# Patient Record
Sex: FEMALE | Race: WHITE | URBAN - METROPOLITAN AREA
[De-identification: names, ages, dates, MRNs, and addresses within clinical notes are randomized per-mention and may not be internally consistent; named-entity substitution may affect disease eponyms.]

---

## 2023-01-23 ENCOUNTER — CLAIMS CREATED FROM THE CLAIM WINDOW (OUTPATIENT)
Dept: URBAN - METROPOLITAN AREA MEDICAL CENTER 25 | Facility: MEDICAL CENTER | Age: 48
End: 2023-01-23

## 2023-01-23 ENCOUNTER — CLAIMS CREATED FROM THE CLAIM WINDOW (OUTPATIENT)
Dept: URBAN - METROPOLITAN AREA MEDICAL CENTER 25 | Facility: MEDICAL CENTER | Age: 48
End: 2023-01-23
Payer: COMMERCIAL

## 2023-01-23 DIAGNOSIS — R93.3 ABN FINDINGS-GI TRACT: ICD-10-CM

## 2023-01-23 DIAGNOSIS — R10.84 ABDOMINAL CRAMPING, GENERALIZED: ICD-10-CM

## 2023-01-23 PROCEDURE — 99204 OFFICE O/P NEW MOD 45 MIN: CPT | Performed by: INTERNAL MEDICINE

## 2023-01-23 PROCEDURE — 99225 SUBSEQUENT OBSERVATION CARE, PER DAY, FOR THE EVALUATION AND MANAGEMENT OF A PATIENT, WHICH REQUIRES AT LEAST 2 OF THESE 3 KEY COMPONENTS: AN EXPAND: CPT | Performed by: INTERNAL MEDICINE

## 2023-02-14 ENCOUNTER — LAB OUTSIDE AN ENCOUNTER (OUTPATIENT)
Dept: URBAN - METROPOLITAN AREA CLINIC 19 | Facility: CLINIC | Age: 48
End: 2023-02-14

## 2023-02-14 PROBLEM — 64613007: Status: ACTIVE | Noted: 2023-02-14

## 2023-02-14 PROBLEM — 307496006 DIVERTICULITIS: Status: ACTIVE | Noted: 2023-02-14

## 2023-02-14 PROBLEM — 398032003: Status: ACTIVE | Noted: 2023-02-14

## 2023-02-14 PROBLEM — 71419002: Status: ACTIVE | Noted: 2023-02-14

## 2023-02-15 ENCOUNTER — OFFICE VISIT (OUTPATIENT)
Dept: URBAN - METROPOLITAN AREA CLINIC 19 | Facility: CLINIC | Age: 48
End: 2023-02-15

## 2023-02-15 NOTE — HPI-TODAY'S VISIT:
2/15/2023:  Honey: The pt is a 48 yo F who presents for [facility] hospital follow-up for a case of abdominal pain, with some nausea and loose stools.  CT imaging was used to diagnose the patient of acute enteritis.  They were seen by my colleague, Dr. Jose Wiley, for consult in mid-Jan.  Stool studies were ordered.  Current diet:  Prior colonoscopies: Smoking history: Alcohol history: OAC/APT use: OTC medication use:  FH of CRCs or colon polyps or other colonic issues.

## 2023-02-21 ENCOUNTER — WEB ENCOUNTER (OUTPATIENT)
Dept: URBAN - METROPOLITAN AREA CLINIC 19 | Facility: CLINIC | Age: 48
End: 2023-02-21

## 2023-02-24 ENCOUNTER — LAB OUTSIDE AN ENCOUNTER (OUTPATIENT)
Dept: URBAN - METROPOLITAN AREA CLINIC 19 | Facility: CLINIC | Age: 48
End: 2023-02-24

## 2023-02-24 ENCOUNTER — DASHBOARD ENCOUNTERS (OUTPATIENT)
Age: 48
End: 2023-02-24

## 2023-02-24 ENCOUNTER — WEB ENCOUNTER (OUTPATIENT)
Dept: URBAN - METROPOLITAN AREA CLINIC 19 | Facility: CLINIC | Age: 48
End: 2023-02-24

## 2023-02-24 ENCOUNTER — OFFICE VISIT (OUTPATIENT)
Dept: URBAN - METROPOLITAN AREA CLINIC 19 | Facility: CLINIC | Age: 48
End: 2023-02-24
Payer: COMMERCIAL

## 2023-02-24 VITALS
HEIGHT: 66 IN | SYSTOLIC BLOOD PRESSURE: 118 MMHG | HEART RATE: 114 BPM | DIASTOLIC BLOOD PRESSURE: 66 MMHG | WEIGHT: 198.8 LBS | TEMPERATURE: 96.3 F | BODY MASS INDEX: 31.95 KG/M2

## 2023-02-24 DIAGNOSIS — K52.9 INFLAMMATION OF SMALL INTESTINE: ICD-10-CM

## 2023-02-24 DIAGNOSIS — Z86.010 HISTORY OF ADENOMATOUS POLYP OF COLON: ICD-10-CM

## 2023-02-24 DIAGNOSIS — R10.10 UPPER ABDOMINAL PAIN: ICD-10-CM

## 2023-02-24 DIAGNOSIS — R11.0 NAUSEA: ICD-10-CM

## 2023-02-24 PROCEDURE — 99214 OFFICE O/P EST MOD 30 MIN: CPT | Performed by: NURSE PRACTITIONER

## 2023-02-24 NOTE — HPI-TODAY'S VISIT:
47-year-old female presents to the office for hospital follow-up.  She was admitted to the hospital 1/22/23 for abdominal pain, nausea.  CT abdomen and pelvis shows generalized wall thickening within numerous loops of the jejunum, could be infectious versus inflammatory, small amount of free fluid in the pelvis, large right hydronephrosis with numerous nonobstructing calculi, pleural effusion with light Pleurx drain in place Labs WBC 10,000, hemoglobin 13.5 She has a history of lung cancer/pleurax drain, treated with surgery, Retevmo/chemo/XRT.  GI PCR negative C. difficile not completed Her pain was epigastric area, improving since leaving the hospital. Pain is intermittent, at random, worse if she eats greasy foods. Nausea has resolved.  Stools are formed, easy to pass. She never had diarrhea, just had increase in bowel movements- was formed.  Denies fever/chills.   She has hx of C diff 2 years ago. Hx of bowel obstruction 8 years ago, unknown cause.  Last Colonoscopy was 8 years ago, had a polyp- done in [place]- unable to recall who. She has never had an EGD.  Denies family hx of colon cancer or IBD.  Had labs with Haris AJ, and has CT chest next week.

## 2023-02-25 PROBLEM — 429047008: Status: ACTIVE | Noted: 2023-02-24

## 2023-03-02 LAB
C-REACTIVE PROTEIN, QUANT: 20
CALPROTECTIN, FECAL: 31
REQUEST PROBLEM: (no result)
SEDIMENTATION RATE-WESTERGREN: (no result)

## 2023-03-07 ENCOUNTER — TELEPHONE ENCOUNTER (OUTPATIENT)
Dept: RURAL CLINIC 1 | Facility: CLINIC | Age: 48
End: 2023-03-07

## 2023-03-09 LAB
C-REACTIVE PROTEIN, QUANT: 22
CALPROTECTIN, FECAL: (no result)
REQUEST PROBLEM: (no result)
SEDIMENTATION RATE-WESTERGREN: (no result)

## 2023-03-13 ENCOUNTER — OFFICE VISIT (OUTPATIENT)
Dept: URBAN - METROPOLITAN AREA SURGERY CENTER 31 | Facility: SURGERY CENTER | Age: 48
End: 2023-03-13

## 2023-03-13 ENCOUNTER — CLAIMS CREATED FROM THE CLAIM WINDOW (OUTPATIENT)
Dept: URBAN - METROPOLITAN AREA CLINIC 4 | Facility: CLINIC | Age: 48
End: 2023-03-13
Payer: COMMERCIAL

## 2023-03-13 ENCOUNTER — CLAIMS CREATED FROM THE CLAIM WINDOW (OUTPATIENT)
Dept: URBAN - METROPOLITAN AREA SURGERY CENTER 31 | Facility: SURGERY CENTER | Age: 48
End: 2023-03-13
Payer: COMMERCIAL

## 2023-03-13 DIAGNOSIS — R10.13 ABDOMINAL DISCOMFORT, EPIGASTRIC: ICD-10-CM

## 2023-03-13 DIAGNOSIS — R11.0 AM NAUSEA: ICD-10-CM

## 2023-03-13 DIAGNOSIS — K29.70 GASTRITIS, UNSPECIFIED, WITHOUT BLEEDING: ICD-10-CM

## 2023-03-13 DIAGNOSIS — K31.89 OTHER DISEASES OF STOMACH AND DUODENUM: ICD-10-CM

## 2023-03-13 DIAGNOSIS — K31.89 ACQUIRED DEFORMITY OF DUODENUM: ICD-10-CM

## 2023-03-13 DIAGNOSIS — Z86.010 ADENOMAS PERSONAL HISTORY OF COLONIC POLYPS: ICD-10-CM

## 2023-03-13 DIAGNOSIS — K63.5 BENIGN COLON POLYP: ICD-10-CM

## 2023-03-13 DIAGNOSIS — K62.1 ANAL AND RECTAL POLYP: ICD-10-CM

## 2023-03-13 PROCEDURE — 45385 COLONOSCOPY W/LESION REMOVAL: CPT | Performed by: INTERNAL MEDICINE

## 2023-03-13 PROCEDURE — G8427 DOCREV CUR MEDS BY ELIG CLIN: HCPCS | Performed by: INTERNAL MEDICINE

## 2023-03-13 PROCEDURE — 88342 IMHCHEM/IMCYTCHM 1ST ANTB: CPT | Performed by: PATHOLOGY

## 2023-03-13 PROCEDURE — 88305 TISSUE EXAM BY PATHOLOGIST: CPT | Performed by: PATHOLOGY

## 2023-03-13 PROCEDURE — 45380 COLONOSCOPY AND BIOPSY: CPT | Performed by: INTERNAL MEDICINE

## 2023-03-13 PROCEDURE — 88312 SPECIAL STAINS GROUP 1: CPT | Performed by: PATHOLOGY

## 2023-03-13 PROCEDURE — 43239 EGD BIOPSY SINGLE/MULTIPLE: CPT | Performed by: INTERNAL MEDICINE
